# Patient Record
(demographics unavailable — no encounter records)

---

## 2025-06-11 NOTE — PHYSICAL EXAM
[Well Developed] : well developed [Well Nourished] : well nourished [No Acute Distress] : no acute distress [Normal Venous Pressure] : normal venous pressure [Normal Conjunctiva] : normal conjunctiva [No Carotid Bruit] : no carotid bruit [Normal S1, S2] : normal S1, S2 [No Murmur] : no murmur [No Rub] : no rub [No Gallop] : no gallop [Good Air Entry] : good air entry [Clear Lung Fields] : clear lung fields [Soft] : abdomen soft [No Respiratory Distress] : no respiratory distress  [Non Tender] : non-tender [No Masses/organomegaly] : no masses/organomegaly [Normal Bowel Sounds] : normal bowel sounds [Normal Gait] : normal gait [No Edema] : no edema [No Cyanosis] : no cyanosis [No Clubbing] : no clubbing [No Varicosities] : no varicosities [No Rash] : no rash [No Skin Lesions] : no skin lesions [Moves all extremities] : moves all extremities [No Focal Deficits] : no focal deficits [Normal Speech] : normal speech [Alert and Oriented] : alert and oriented [Normal memory] : normal memory

## 2025-06-11 NOTE — HISTORY OF PRESENT ILLNESS
[FreeTextEntry1] : The patient is a 57-year-old white male with a past medical history remarkable for hypertension, hypercholesterolemia, obesity, and a family history of premature coronary artery disease who presents for evaluation of chest pain and palpitations. The patient's chest discomfort is sharp in quality.  It occurs at rest.  He reports palpitations that occur at any time. The patient has not been exercising.  Since his last visit rosuvastatin was discontinued and his PCP prescribed Nexletol.  Laboratories are unavailable

## 2025-06-11 NOTE — CARDIOLOGY SUMMARY
[de-identified] : Normal sinus rhythm, poor R wave progression, PVC, left axis deviation, poor R wave progression, no significant change from prior  [de-identified] : - Hypertension: Reported dizziness with hydrochlorothiazide 25 milligrams daily - Abnormal ECG - Nuclear stress test not authorized 4/25/2022, EXERCISE STRESS TEST: 5/3/2022, normal, poor exercise tolerance, D6 - Hypercholesterolemia 10 year cardiac risk 8.6%, 1/26/21 - obesity: BMI 30 - ECHOCARDIOGRAM: 4/25/2022, EF 65%, trace mitral regurgitation and tricuspid regurgitation RVSP 13 - EVENT MONITOR: 5/1/2022, 7 day event, NSR84 (), moderate PVCs

## 2025-06-11 NOTE — DISCUSSION/SUMMARY
[FreeTextEntry1] : Chest pain An echocardiogram was ordered to rule out a cardiomyopathy or valvular abnormality as the etiology of his chest pain, palpitations, and abnormal ECG Coronary CTA ordered to rule out obstructive coronary artery disease as etiology of the patient's chest pain in view of his hypercholesterolemia, hypertension, abnormal ECG, and family history of premature coronary artery disease   Palpitations Laboratories ordered Outpatient telemetry was ordered to rule out a significant tachy arrhythmia as the etiology of his palpitations  hypertension Elevated today.  If remains elevated his medical regimen will be modified    Hypercholesterolemia Intolerant of rosuvastatin.  Fasting lipids on Nexletol ordered   RTO after testing  [EKG obtained to assist in diagnosis and management of assessed problem(s)] : EKG obtained to assist in diagnosis and management of assessed problem(s)